# Patient Record
Sex: MALE | Race: WHITE | URBAN - NONMETROPOLITAN AREA
[De-identification: names, ages, dates, MRNs, and addresses within clinical notes are randomized per-mention and may not be internally consistent; named-entity substitution may affect disease eponyms.]

---

## 2017-06-05 ENCOUNTER — OFFICE VISIT (OUTPATIENT)
Dept: FAMILY MEDICINE CLINIC | Age: 32
End: 2017-06-05

## 2017-06-05 VITALS
RESPIRATION RATE: 20 BRPM | WEIGHT: 214.6 LBS | HEART RATE: 84 BPM | SYSTOLIC BLOOD PRESSURE: 132 MMHG | DIASTOLIC BLOOD PRESSURE: 80 MMHG | BODY MASS INDEX: 30.04 KG/M2 | HEIGHT: 71 IN

## 2017-06-05 DIAGNOSIS — F41.9 ANXIETY: Primary | ICD-10-CM

## 2017-06-05 DIAGNOSIS — R19.05 PERIUMBILICAL MASS: ICD-10-CM

## 2017-06-05 DIAGNOSIS — L85.9 HYPERKERATOSIS OF SKIN: ICD-10-CM

## 2017-06-05 DIAGNOSIS — H91.93 HEARING LOSS, BILATERAL: ICD-10-CM

## 2017-06-05 DIAGNOSIS — L40.9 SCALP PSORIASIS: ICD-10-CM

## 2017-06-05 DIAGNOSIS — Z51.81 MEDICATION MONITORING ENCOUNTER: ICD-10-CM

## 2017-06-05 DIAGNOSIS — Z00.00 ROUTINE GENERAL MEDICAL EXAMINATION AT A HEALTH CARE FACILITY: ICD-10-CM

## 2017-06-05 PROCEDURE — 99205 OFFICE O/P NEW HI 60 MIN: CPT | Performed by: FAMILY MEDICINE

## 2017-06-05 RX ORDER — WATER / MINERAL OIL / WHITE PETROLATUM 16 OZ
CREAM TOPICAL
Qty: 1 PACKAGE | COMMUNITY
Start: 2017-06-05

## 2017-06-05 RX ORDER — SULFAMETHOXAZOLE AND TRIMETHOPRIM 800; 160 MG/1; MG/1
1 TABLET ORAL 2 TIMES DAILY
Qty: 20 TABLET | Refills: 0 | Status: SHIPPED | OUTPATIENT
Start: 2017-06-05 | End: 2017-06-15

## 2017-06-05 ASSESSMENT — PATIENT HEALTH QUESTIONNAIRE - PHQ9
1. LITTLE INTEREST OR PLEASURE IN DOING THINGS: 1
SUM OF ALL RESPONSES TO PHQ QUESTIONS 1-9: 1
SUM OF ALL RESPONSES TO PHQ9 QUESTIONS 1 & 2: 1
2. FEELING DOWN, DEPRESSED OR HOPELESS: 0

## 2017-06-09 ENCOUNTER — TELEPHONE (OUTPATIENT)
Dept: FAMILY MEDICINE CLINIC | Age: 32
End: 2017-06-09

## 2017-06-29 ENCOUNTER — PROCEDURE VISIT (OUTPATIENT)
Dept: FAMILY MEDICINE CLINIC | Age: 32
End: 2017-06-29

## 2017-06-29 VITALS
BODY MASS INDEX: 30.71 KG/M2 | SYSTOLIC BLOOD PRESSURE: 145 MMHG | DIASTOLIC BLOOD PRESSURE: 85 MMHG | RESPIRATION RATE: 15 BRPM | WEIGHT: 214.51 LBS | HEART RATE: 96 BPM | HEIGHT: 70 IN

## 2017-06-29 DIAGNOSIS — E78.00 PURE HYPERCHOLESTEROLEMIA: ICD-10-CM

## 2017-06-29 DIAGNOSIS — L30.9 DERMATITIS: Primary | ICD-10-CM

## 2017-06-29 PROCEDURE — 11100 PR BIOPSY OF SKIN LESION: CPT | Performed by: FAMILY MEDICINE

## 2017-07-05 ENCOUNTER — TELEPHONE (OUTPATIENT)
Dept: AUDIOLOGY | Age: 32
End: 2017-07-05

## 2017-07-10 ENCOUNTER — TELEPHONE (OUTPATIENT)
Dept: FAMILY MEDICINE CLINIC | Age: 32
End: 2017-07-10

## 2017-07-10 ENCOUNTER — NURSE ONLY (OUTPATIENT)
Dept: FAMILY MEDICINE CLINIC | Age: 32
End: 2017-07-10

## 2017-07-10 DIAGNOSIS — L65.9 ALOPECIA: Primary | ICD-10-CM

## 2017-07-10 PROCEDURE — 99024 POSTOP FOLLOW-UP VISIT: CPT | Performed by: FAMILY MEDICINE

## 2023-08-10 ENCOUNTER — HOSPITAL ENCOUNTER (INPATIENT)
Age: 38
LOS: 4 days | Discharge: HOME | DRG: 885 | End: 2023-08-14
Attending: PSYCHIATRY & NEUROLOGY | Admitting: PSYCHIATRY & NEUROLOGY

## 2023-08-10 DIAGNOSIS — F10.929 ACUTE ALCOHOLIC INTOXICATION WITH COMPLICATION (HCC): ICD-10-CM

## 2023-08-10 DIAGNOSIS — R45.851 DEPRESSION WITH SUICIDAL IDEATION: Primary | ICD-10-CM

## 2023-08-10 DIAGNOSIS — F32.A DEPRESSION WITH SUICIDAL IDEATION: Primary | ICD-10-CM

## 2023-08-10 PROBLEM — F33.0 MDD (MAJOR DEPRESSIVE DISORDER), RECURRENT EPISODE, MILD (HCC): Status: ACTIVE | Noted: 2023-08-10

## 2023-08-10 LAB
ALBUMIN SERPL BCG-MCNC: 4.8 G/DL (ref 3.5–5.1)
ALP SERPL-CCNC: 77 U/L (ref 38–126)
ALT SERPL W/O P-5'-P-CCNC: 38 U/L (ref 11–66)
AMPHETAMINES UR QL SCN: NEGATIVE
ANION GAP SERPL CALC-SCNC: 15 MEQ/L (ref 8–16)
AST SERPL-CCNC: 29 U/L (ref 5–40)
BARBITURATES UR QL SCN: NEGATIVE
BASOPHILS ABSOLUTE: 0.1 THOU/MM3 (ref 0–0.1)
BASOPHILS NFR BLD AUTO: 1.3 %
BENZODIAZ UR QL SCN: NEGATIVE
BILIRUB CONJ SERPL-MCNC: < 0.2 MG/DL (ref 0–0.3)
BILIRUB SERPL-MCNC: 0.3 MG/DL (ref 0.3–1.2)
BUN SERPL-MCNC: 10 MG/DL (ref 7–22)
BZE UR QL SCN: NEGATIVE
CALCIUM SERPL-MCNC: 9.5 MG/DL (ref 8.5–10.5)
CANNABINOIDS UR QL SCN: NEGATIVE
CHLORIDE SERPL-SCNC: 104 MEQ/L (ref 98–111)
CO2 SERPL-SCNC: 21 MEQ/L (ref 23–33)
CREAT SERPL-MCNC: 0.8 MG/DL (ref 0.4–1.2)
DEPRECATED RDW RBC AUTO: 41.7 FL (ref 35–45)
EOSINOPHIL NFR BLD AUTO: 0.8 %
EOSINOPHILS ABSOLUTE: 0 THOU/MM3 (ref 0–0.4)
ERYTHROCYTE [DISTWIDTH] IN BLOOD BY AUTOMATED COUNT: 12.8 % (ref 11.5–14.5)
ETHANOL SERPL-MCNC: 0.09 %
ETHANOL SERPL-MCNC: 0.21 %
FENTANYL: NEGATIVE
GFR SERPL CREATININE-BSD FRML MDRD: > 60 ML/MIN/1.73M2
GLUCOSE SERPL-MCNC: 134 MG/DL (ref 70–108)
HCT VFR BLD AUTO: 49.8 % (ref 42–52)
HGB BLD-MCNC: 17 GM/DL (ref 14–18)
IMM GRANULOCYTES # BLD AUTO: 0.01 THOU/MM3 (ref 0–0.07)
IMM GRANULOCYTES NFR BLD AUTO: 0.2 %
LYMPHOCYTES ABSOLUTE: 1.8 THOU/MM3 (ref 1–4.8)
LYMPHOCYTES NFR BLD AUTO: 34.8 %
MCH RBC QN AUTO: 30.4 PG (ref 26–33)
MCHC RBC AUTO-ENTMCNC: 34.1 GM/DL (ref 32.2–35.5)
MCV RBC AUTO: 89.1 FL (ref 80–94)
MONOCYTES ABSOLUTE: 0.2 THOU/MM3 (ref 0.4–1.3)
MONOCYTES NFR BLD AUTO: 4.2 %
NEUTROPHILS NFR BLD AUTO: 58.7 %
NRBC BLD AUTO-RTO: 0 /100 WBC
OPIATES UR QL SCN: NEGATIVE
OSMOLALITY SERPL CALC.SUM OF ELEC: 280.4 MOSMOL/KG (ref 275–300)
OXYCODONE, OPI5M: NEGATIVE
PCP UR QL SCN: NEGATIVE
PLATELET # BLD AUTO: 357 THOU/MM3 (ref 130–400)
PMV BLD AUTO: 10.3 FL (ref 9.4–12.4)
POTASSIUM SERPL-SCNC: 4.2 MEQ/L (ref 3.5–5.2)
PROT SERPL-MCNC: 7.7 G/DL (ref 6.1–8)
RBC # BLD AUTO: 5.59 MILL/MM3 (ref 4.7–6.1)
SEGMENTED NEUTROPHILS ABSOLUTE COUNT: 3.1 THOU/MM3 (ref 1.8–7.7)
SODIUM SERPL-SCNC: 140 MEQ/L (ref 135–145)
WBC # BLD AUTO: 5.2 THOU/MM3 (ref 4.8–10.8)

## 2023-08-10 PROCEDURE — 82077 ASSAY SPEC XCP UR&BREATH IA: CPT

## 2023-08-10 PROCEDURE — 6370000000 HC RX 637 (ALT 250 FOR IP): Performed by: PHYSICIAN ASSISTANT

## 2023-08-10 PROCEDURE — 80307 DRUG TEST PRSMV CHEM ANLYZR: CPT

## 2023-08-10 PROCEDURE — 80053 COMPREHEN METABOLIC PANEL: CPT

## 2023-08-10 PROCEDURE — 36415 COLL VENOUS BLD VENIPUNCTURE: CPT

## 2023-08-10 PROCEDURE — 99285 EMERGENCY DEPT VISIT HI MDM: CPT

## 2023-08-10 PROCEDURE — 1240000000 HC EMOTIONAL WELLNESS R&B

## 2023-08-10 PROCEDURE — 6370000000 HC RX 637 (ALT 250 FOR IP): Performed by: PSYCHIATRY & NEUROLOGY

## 2023-08-10 PROCEDURE — 85025 COMPLETE CBC W/AUTO DIFF WBC: CPT

## 2023-08-10 PROCEDURE — 82248 BILIRUBIN DIRECT: CPT

## 2023-08-10 RX ORDER — MAGNESIUM HYDROXIDE/ALUMINUM HYDROXICE/SIMETHICONE 120; 1200; 1200 MG/30ML; MG/30ML; MG/30ML
30 SUSPENSION ORAL EVERY 6 HOURS PRN
Status: DISCONTINUED | OUTPATIENT
Start: 2023-08-10 | End: 2023-08-14 | Stop reason: HOSPADM

## 2023-08-10 RX ORDER — NICOTINE 21 MG/24HR
1 PATCH, TRANSDERMAL 24 HOURS TRANSDERMAL DAILY
Status: DISCONTINUED | OUTPATIENT
Start: 2023-08-10 | End: 2023-08-13

## 2023-08-10 RX ORDER — ACETAMINOPHEN 325 MG/1
650 TABLET ORAL EVERY 4 HOURS PRN
Status: DISCONTINUED | OUTPATIENT
Start: 2023-08-10 | End: 2023-08-14 | Stop reason: HOSPADM

## 2023-08-10 RX ORDER — IBUPROFEN 400 MG/1
400 TABLET ORAL EVERY 6 HOURS PRN
Status: DISCONTINUED | OUTPATIENT
Start: 2023-08-10 | End: 2023-08-14 | Stop reason: HOSPADM

## 2023-08-10 RX ORDER — TRAZODONE HYDROCHLORIDE 50 MG/1
50 TABLET ORAL NIGHTLY PRN
Status: DISCONTINUED | OUTPATIENT
Start: 2023-08-10 | End: 2023-08-14 | Stop reason: HOSPADM

## 2023-08-10 RX ORDER — HYDROXYZINE HYDROCHLORIDE 25 MG/1
50 TABLET, FILM COATED ORAL 3 TIMES DAILY PRN
Status: DISCONTINUED | OUTPATIENT
Start: 2023-08-10 | End: 2023-08-14 | Stop reason: HOSPADM

## 2023-08-10 RX ORDER — LORAZEPAM 1 MG/1
1 TABLET ORAL ONCE
Status: COMPLETED | OUTPATIENT
Start: 2023-08-10 | End: 2023-08-10

## 2023-08-10 RX ADMIN — LORAZEPAM 1 MG: 1 TABLET ORAL at 15:21

## 2023-08-10 RX ADMIN — HYDROXYZINE HYDROCHLORIDE 50 MG: 25 TABLET, FILM COATED ORAL at 20:52

## 2023-08-10 RX ADMIN — TRAZODONE HYDROCHLORIDE 50 MG: 50 TABLET ORAL at 20:52

## 2023-08-10 ASSESSMENT — SLEEP AND FATIGUE QUESTIONNAIRES
DO YOU HAVE DIFFICULTY SLEEPING: YES
SLEEP PATTERN: DISTURBED/INTERRUPTED SLEEP
DO YOU HAVE DIFFICULTY SLEEPING: YES
SLEEP PATTERN: DIFFICULTY FALLING ASLEEP;DISTURBED/INTERRUPTED SLEEP
DO YOU USE A SLEEP AID: NO
DO YOU USE A SLEEP AID: COMMENT
AVERAGE NUMBER OF SLEEP HOURS: 4

## 2023-08-10 ASSESSMENT — PATIENT HEALTH QUESTIONNAIRE - PHQ9
SUM OF ALL RESPONSES TO PHQ QUESTIONS 1-9: 27
SUM OF ALL RESPONSES TO PHQ QUESTIONS 1-9: 27

## 2023-08-10 ASSESSMENT — LIFESTYLE VARIABLES
HOW MANY STANDARD DRINKS CONTAINING ALCOHOL DO YOU HAVE ON A TYPICAL DAY: 3 OR 4
HOW OFTEN DO YOU HAVE A DRINK CONTAINING ALCOHOL: 2-4 TIMES A MONTH
HOW MANY STANDARD DRINKS CONTAINING ALCOHOL DO YOU HAVE ON A TYPICAL DAY: 10 OR MORE
HOW OFTEN DO YOU HAVE A DRINK CONTAINING ALCOHOL: 2-3 TIMES A WEEK

## 2023-08-10 ASSESSMENT — PAIN SCALES - GENERAL
PAINLEVEL_OUTOF10: 0
PAINLEVEL_OUTOF10: 0

## 2023-08-10 NOTE — ED NOTES
Patient becoming anxious and agitated, pacing around the room stating \" I want to leave\" and also stating \"just shoot me\" at one point patient picked up chair but then sat it back down. Chair removed from room provider notified.  Patient medicated per order will continue to monitor      Antonio Qiu RN  08/10/23 7615

## 2023-08-10 NOTE — ED NOTES
Patient sitting in bed. Cassandra Patrick in room for evaluation.  Victor M SEO at bedside      [place], [place]  08/10/23 3589

## 2023-08-10 NOTE — ED NOTES
Patient resting in bed with eyes closed and easy unlabored respirations.  No concerns voiced at this time      Melecio Hart RN  08/10/23 0528

## 2023-08-10 NOTE — ED NOTES
Shannan Gabriel dad [phone]  Desirae Feng mom [phone]    Pt escalating-states he is going to leave-[employer] had dad step out of the [address] [place] [address] Drive in to speak with patient-Patient states he does not want his parents in the room. Spoke with parents at this time.  They are going to go home at this time, but please call if needed     Manny Coon RN  08/10/23 0610

## 2023-08-10 NOTE — ED NOTES
Patient escorted to ER by police for suicidal ideation. Patient reports drinking heavily and got into an argument with family member. Police was called due to patient wanting to be shot. When police arrived patient wanted shot by them. On arrival patient is calm and cooperative. Patient keeps making the same statement \"just shoot me\".      Aniya Urbano RN  08/10/23 9802

## 2023-08-10 NOTE — PROGRESS NOTES
BAL 24 Hour Re-Assess:   Status & Exam & Behavior Support Service Tabs      Present Suicidal Behavior:      Verbal: Denies    Plan: Denies    Current Suicide Risk: Low, Moderate or High: High      Present Homicidal Behavior:    Verbal: Denies    Plan: Denies      Psychosis:    Hallucinations: Denies    Delusions: None noted      Clinical Re-Assessment Summary including Current Mental State of Patient:     Initial assessment:    Patient is a 40year old male who presents to the ED under [facility] via [facility]. Per [facility], \"Law enforcement was called by Bruce Singh parents to come to their home where Margo was. Dispatch advised parents stated he was suicidal and becoming combative. Was also advised Ravin Long had been drinking. When I arrived Margo was standing in the driveway. He had a defensive position, leaning slightly forward, feet part, clinching fists. He stated numerous times he wanted to die by suicide by . He yelled numerous times we needed to pull our guns. Just shoot him. He stated the world would be better off without him. He would talk to me stating I was Lon Garcia. He eventually came up where I was. Turned around and put his hands behind his back. I advised him he was not under arrest but was going to be cuffed. He indicated he was ok with that. On the way to [address] [place] [address] he stated numerous times the world would be better off without him. He stated he did not want to hurt anyone and would just wait a couple weeks then kill himself. He stated he has nothing to live for (no wife, kids) \". Upon arrival, pt appears intoxicated. Pt's BAL was . 24 Spoke with pt who reports he is suicidal \"everyday. .continuous. .concurrent. . whatever you want to put on that piece of paper\". Pt states his plan would be to \"take car left on railroad tracks, carbon monoxide poisoning or blow his brains out\" if he had access to gun. Denied previous suicidal attempts.  Pt is actively suicidal stating \"just shoot me in the

## 2023-08-10 NOTE — ED NOTES
Pateint resting in bed no concerns voiced at this time will continue to monitor      Kristel Harman RN  08/10/23 6668

## 2023-08-10 NOTE — ED NOTES
Patient placed in safe room that is ligature resistant with continuous monitoring in place. Provider notified, requested an assessment by behavioral health . Patient belongings secured in a locked lockers outside of the room. Explained suicide prevention precautions to the patient including constant observer.        Melecio Hart RN  08/10/23 0900

## 2023-08-10 NOTE — PROGRESS NOTES
Chief Complaint: Suicidal    Provisional Diagnosis:     Unspecified Depressive Disorder      Risk, Psychosocial and Contextual Factors:  Previous legal, substance use. Current  Treatment:         Denied        Present Suicidal Behavior:       Verbal: Yes                                                    Attempt: None reported     Access to Weapons:  No, pt states he is a felon     Current Suicide Risk: Low, Moderate or High:  High     Past Suicidal Behavior:                 Verbal: Yes    Attempt: Denied     Self-Injurious/Self-Mutilation: Denied     Traumatic Event Within Past 2 Weeks:   Denied     Current Abuse: None reported     Legal: Yes, history. Per record search, pt was previously charged with 'aggravated burglary and felonious assault'. Violence: Yes, history     Protective Factors:  Support from family     Housing: Resides with his parents     CPAP/Oxygen/Ambulation Difficulties: see H&P     Basic Vital Signs Normal?: Check with Patients Nurse prior to Calling Psychiatry     Critical Labs?: Check with Patients Nurse prior to Calling Psychiatry     Clinical Summary:       Patient is a 40year old male who presents to the ED under [facility] via [facility]. Per [facility], \"Law enforcement was called by Christen rangel to come to their home where Margo was. Dispatch advised parents stated he was suicidal and becoming combative. Was also advised Ciaran Dailey had been drinking. When I arrived Margo was standing in the driveway. He had a defensive position, leaning slightly forward, feet part, clinching fists. He stated numerous times he wanted to die by suicide by . He yelled numerous times we needed to pull our guns. Just shoot him. He stated the world would be better off without him. He would talk to me stating I was Antonio Amaya. He eventually came up where I was. Turned around and put his hands behind his back. I advised him he was not under arrest but was going to be cuffed.  He indicated he was

## 2023-08-11 PROBLEM — F33.2 MDD (MAJOR DEPRESSIVE DISORDER), RECURRENT SEVERE, WITHOUT PSYCHOSIS (HCC): Status: ACTIVE | Noted: 2023-08-10

## 2023-08-11 PROCEDURE — 6370000000 HC RX 637 (ALT 250 FOR IP): Performed by: PHYSICIAN ASSISTANT

## 2023-08-11 PROCEDURE — APPSS30 APP SPLIT SHARED TIME 16-30 MINUTES: Performed by: PHYSICIAN ASSISTANT

## 2023-08-11 PROCEDURE — 1240000000 HC EMOTIONAL WELLNESS R&B

## 2023-08-11 PROCEDURE — 6370000000 HC RX 637 (ALT 250 FOR IP): Performed by: PSYCHIATRY & NEUROLOGY

## 2023-08-11 RX ORDER — MIRTAZAPINE 7.5 MG/1
7.5 TABLET, FILM COATED ORAL NIGHTLY
Status: DISCONTINUED | OUTPATIENT
Start: 2023-08-11 | End: 2023-08-14 | Stop reason: HOSPADM

## 2023-08-11 RX ORDER — LORAZEPAM 2 MG/1
2 TABLET ORAL EVERY 6 HOURS PRN
Status: DISCONTINUED | OUTPATIENT
Start: 2023-08-11 | End: 2023-08-14 | Stop reason: HOSPADM

## 2023-08-11 RX ORDER — HALOPERIDOL 5 MG/ML
5 INJECTION INTRAMUSCULAR EVERY 6 HOURS PRN
Status: DISCONTINUED | OUTPATIENT
Start: 2023-08-11 | End: 2023-08-14 | Stop reason: HOSPADM

## 2023-08-11 RX ORDER — HALOPERIDOL 5 MG/1
5 TABLET ORAL EVERY 6 HOURS PRN
Status: DISCONTINUED | OUTPATIENT
Start: 2023-08-11 | End: 2023-08-14 | Stop reason: HOSPADM

## 2023-08-11 RX ORDER — LORAZEPAM 2 MG/ML
2 INJECTION INTRAMUSCULAR EVERY 6 HOURS PRN
Status: DISCONTINUED | OUTPATIENT
Start: 2023-08-11 | End: 2023-08-14 | Stop reason: HOSPADM

## 2023-08-11 RX ADMIN — LORAZEPAM 2 MG: 2 TABLET ORAL at 10:36

## 2023-08-11 RX ADMIN — MIRTAZAPINE 7.5 MG: 7.5 TABLET ORAL at 20:46

## 2023-08-11 RX ADMIN — HALOPERIDOL 5 MG: 5 TABLET ORAL at 10:36

## 2023-08-11 RX ADMIN — HYDROXYZINE HYDROCHLORIDE 50 MG: 25 TABLET, FILM COATED ORAL at 20:46

## 2023-08-11 RX ADMIN — TRAZODONE HYDROCHLORIDE 50 MG: 50 TABLET ORAL at 20:46

## 2023-08-11 ASSESSMENT — PAIN SCALES - GENERAL: PAINLEVEL_OUTOF10: 0

## 2023-08-11 NOTE — PROGRESS NOTES
Behavioral Health   Admission Note   Admission Type: Involuntary    Reason for Admission: suicidal thoughts    Patient Strengths/Barriers  Strengths (Must Choose Two): Stable housing, Support from family  Barriers: Motivation level for treatment, Support of organized community    Addictive Behavior  In the Past 3 Months, Have You Felt or Has Someone Told You That You Have a Problem With  : None    Medical Problems:   Past Medical History:   Diagnosis Date    Anxiety     Psychiatric problem        Status EXAM:  Mental Status and Behavioral Exam  Normal: No  Level of Assistance: Independent/Self  Facial Expression: Worried, Sad  Affect: Blunt  Level of Consciousness: Alert  Frequency of Checks: 4 times per hour, close  Mood:Normal: No  Mood: Depressed, Anxious  Motor Activity:Normal: Yes  Eye Contact: Fair  Observed Behavior: Guarded  Sexual Misconduct History: Past - no (pt denies)  Preception: [place] to person, [place] to time, [place] to place, [place] to situation  Attention:Normal: Yes  Thought Processes: Circumstantial  Thought Content:Normal: Yes  Depression Symptoms: Change in energy level, Sleep disturbance, Loss of interest, Impaired concentration  Anxiety Symptoms: Generalized  Malinda Symptoms: No problems reported or observed. Hallucinations: None  Delusions: No  Memory:Normal: Yes  Insight and Judgment: No  Insight and Judgment: Poor insight    Pt admitted with followings belongings:  Dental Appliances: None  Vision - Corrective Lenses: None  Hearing Aid: None  Jewelry: None  Body Piercings Removed: N/A  Clothing: Belt, Pants, Shirt (pair of boots, vape, assorted cards in the wallet)  Other Valuables: [facility]     Admission order obtained Yes  Valuables placed in lock up. Patient's home medications were none. Patient oriented to surroundings and program expectations and copy of patient rights given. Received admission packet:  Yes  Consents reviewed, signed Yes.  Outcomes Questionnaire completed

## 2023-08-11 NOTE — PROGRESS NOTES
Patient approaches unit demanding to leave. Writer attempts to educate patient about [facility]. Patient refuses education. Patient begins accusing writer of holding him against his will. Writer again attempts to educate patient about [facility]. Patient states there is no reason to keep him here under [facility]. Writer reminds patients of statements he's made regarding suicidal ideation. Patient states \"It's been that way my whole life! \" Patient states \"If you don't let me out of here, I'm gonna lose my job. And if I lose my job, that doesn't end well. \" [place] police called by charge nurse for show of support. Patient offered chance to use phone to call work. Patient states he does not have the number for work. Writer offers to look up phone number. Patient refuses stating it cannot be found on Internet. Patient offered prn agitation medication. Patient refuses. Patient states \"I'll just sit in my room locked up for the next 3 fucking days. \"

## 2023-08-11 NOTE — H&P
start Remeron and titrate to effect  Consults: none    Risk level: High     Behavioral Services  Medicare Certification     Admission Day 1  I certify that this patient's inpatient psychiatric hospital admission is medically necessary for:    x (1) treatment which could reasonably be expected to improve this patient's condition, or    x (2) diagnostic study or its equivalent. Rao Tang is a 40 y.o. male being evaluated by a Virtual Visit (video visit) encounter to address concerns as mentioned above. A caregiver was present in the room along with the patient. Services were provided through a video synchronous discussion virtually to substitute for in-person visit by provider. Patient's consent was obtained for Tele visit. Patient is present at [address] [place] [address] and I am physically present at [place], [place]. This Virtual Visit was conducted with patient's consent and standard of care was followed with help of in person evaluation by ADRIANO. The patient is located in a state ([place]) where I am licensed to provide care. --Wilmar Acosta MD on 8/11/2023 at 1:48 PM    An electronic signature was used to authenticate this note. **This report has been created using voice recognition software. It may contain minor errors which are inherent in voice recognition technology. **

## 2023-08-11 NOTE — PROGRESS NOTES
OQ Admission Report    Most Recent Score:    111    Baseline Score:   111    Change From Initial: No Reliable Change  Distress Level: Severe       Graph Type: Total  Most Recent Critical Item Status:  7. Suicide - I have thoughts of ending my life. Almost Always  11. Substance Abuse - I use alcohol or a drug to get going in the morning. Never  20. Substance Abuse - People criticize my drinking (or drug use). Almost Always  24. Substance Abuse - I have trouble at work/school or other daily activities because of drinking or drug use.  Almost Always

## 2023-08-11 NOTE — PROGRESS NOTES
Behavioral Services  Medicare Certification Upon Admission    I certify that this patient's inpatient psychiatric hospital admission is medically necessary for:    [x] (1) Treatment which could reasonably be expected to improve this patient's condition,       [x] (2) Or for diagnostic study;     AND     [x](2) The inpatient psychiatric services are provided while the individual is under the care of a physician and are included in the individualized plan of care.     Estimated length of stay/service 3-5 days    Plan for post-hospital care hc    Electronically signed by Sahara Milan MD on 8/11/2023 at 8:00 AM

## 2023-08-12 PROBLEM — F41.9 ANXIETY DISORDER: Status: ACTIVE | Noted: 2023-08-12

## 2023-08-12 PROBLEM — F33.2 SEVERE EPISODE OF RECURRENT MAJOR DEPRESSIVE DISORDER, WITHOUT PSYCHOTIC FEATURES (HCC): Status: ACTIVE | Noted: 2023-08-12

## 2023-08-12 PROCEDURE — 6370000000 HC RX 637 (ALT 250 FOR IP): Performed by: PHYSICIAN ASSISTANT

## 2023-08-12 PROCEDURE — 1240000000 HC EMOTIONAL WELLNESS R&B

## 2023-08-12 PROCEDURE — 6370000000 HC RX 637 (ALT 250 FOR IP): Performed by: PSYCHIATRY & NEUROLOGY

## 2023-08-12 PROCEDURE — APPSS30 APP SPLIT SHARED TIME 16-30 MINUTES: Performed by: NURSE PRACTITIONER

## 2023-08-12 RX ADMIN — HALOPERIDOL 5 MG: 5 TABLET ORAL at 10:51

## 2023-08-12 RX ADMIN — LORAZEPAM 2 MG: 2 TABLET ORAL at 10:52

## 2023-08-12 RX ADMIN — HYDROXYZINE HYDROCHLORIDE 50 MG: 25 TABLET, FILM COATED ORAL at 20:24

## 2023-08-12 RX ADMIN — TRAZODONE HYDROCHLORIDE 50 MG: 50 TABLET ORAL at 20:24

## 2023-08-12 RX ADMIN — MIRTAZAPINE 7.5 MG: 7.5 TABLET ORAL at 20:24

## 2023-08-12 ASSESSMENT — PAIN SCALES - GENERAL: PAINLEVEL_OUTOF10: 0

## 2023-08-12 NOTE — PATIENT CARE CONFERENCE
Psychosocial Assessment    Current Level of Psychosocial Functioning     Independent   Dependent    Minimal Assist     Comments:  IRASEAM    Psychosocial High Risk Factors (check all that apply)    Unable to obtain meds   Chronic illness/pain    Substance abuse   Lack of Family Support   Financial stress   Isolation   Inadequate Community Resources  Suicide attempt(s)  Not taking medications   Victim of crime   Developmental Delay  Unable to manage personal needs    Age 72 or older   Homeless  No transportation   Readmission within 30 days  Unemployment  Traumatic Event  IRASEMA    Family/Supports identified: IRASEMA    Sexual Orientation:  IRASEMA    Patient Strengths: IRASEMA    Patient Barriers: IRASEMA    Safety plan: IRASEMA    CMHC/MH history: IRASEMA    Plan of Care:  medication management, group/individual therapies, family meetings, psycho -education, treatment team meetings to assist with stabilization    Initial Discharge Plan:  IRASEMA    Clinical Summary:  IRASEMA
[facility]  Initial Interdisciplinary Treatment Plan NOTE    REVIEW DATE AND TIME: 8/11/23 1532    PATIENT was not IN TREATMENT TEAM.  See Multidisciplinary Treatment Team sheet for participants. ADMISSION TYPE:   Admission Type: Involuntary    REASON FOR ADMISSION:  Reason for Admission: suicidal thoughts      Estimated Length of Stay Update:  3-5 days  Estimated Discharge Date Update: 8/12/23    Patient Strengths/Barriers  Strengths (Must Choose Two): Stable housing, Support from family  Barriers: Motivation level for treatment, Support of organized community  Addictive Behavior:Addictive Behavior  In the Past 3 Months, Have You Felt or Has Someone Told You That You Have a Problem With  : None  Medical Problems:  Past Medical History:   Diagnosis Date    Anxiety     Psychiatric problem        EDUCATION:   Learner Progress Toward Treatment Goals: Reviewed results and recommendations of this team, Reviewed group plan and strategies, Reviewed signs, symptoms and risk of self harm and violent behavior, and Reviewed goals and plan of care    Method: Individual    Outcome: No evidence of Learning    PATIENT GOALS: IRASEMA    OQ TOP QUALITY PRIORITIES FOR THE PATIENT AS IDENTIFIED ON ADMISSION ADMINISTRATION:        111    PLAN/TREATMENT RECOMMENDATIONS UPDATE:   What is the most important thing we can help you with while you are here? IRASEMA  Who is your support system? IRASEMA  Do you have follow-up providers? IRASEMA  Do you have the ability to pay for your medications? Yes  Where will you be residing when you leave the hospital? [facility]  Will need a return to work slip or FMLA paper completion?  Yes      GOALS UPDATE:   Time frame for Short-Term Goals: Daily    OLAYINKA Diez
Independent/Self    Patient Monitoring:  Frequency of Checks: 4 times per hour, close    Psychiatric Symptoms:   Depression Symptoms  Depression Symptoms: Isolative, Change in energy level, Loss of interest, Impaired concentration  Anxiety Symptoms  Anxiety Symptoms: Generalized  Malinda Symptoms  Malinda Symptoms: No problems reported or observed. Suicide Risk CSSR-S:  1) Within the past month, have you wished you were dead or wished you could go to sleep and not wake up? : Yes  2) Have you actually had any thoughts of killing yourself? : Yes  3) Have you been thinking about how you might kill yourself? : Yes  5) Have you started to work out or worked out the details of how to kill yourself? Do you intend to carry out this plan? : No  6) Have you ever done anything, started to do anything, or prepared to do anything to end your life?: No    EDUCATION:   Learner Progress Toward Treatment Goals: Reviewed results and recommendations of this team, Reviewed group plan and strategies, Reviewed signs, symptoms and risk of self harm and violent behavior, and Reviewed goals and plan of care    Method: Individual    Outcome: No evidence of Learning and Refused Education    PATIENT GOALS: IRASEMA    OQ TOP QUALITY PRIORITIES FOR THE PATIENT AS IDENTIFIED ON ADMISSION ADMINISTRATION:        111- Suicidal Ideation/Substance Abuse      PLAN/TREATMENT RECOMMENDATIONS UPDATE:  How are you progressing toward meeting your main treatment goal? IRASEMA  2. Are there discharge barriers/lingering problems that need to be addressed? Patient provides minimal information. Pt states \"I'm going to be sitting here in my bed today, tomorrow, and the next day\". Patient states \"this made me lose my job so this has only made things worse\". Pt very irritable. 3.  Do you have the ability to pay for your medications? No      4. How is your group participation? Not attending. Patient is isolated in room.      GOALS UPDATE:   Time frame for

## 2023-08-12 NOTE — PROGRESS NOTES
Pt did not attend goal wrap up and relaxation group, pt isolated to room and napped all evening, 1:1 offered, pt denies setting a goal today.

## 2023-08-12 NOTE — PROGRESS NOTES
Discharge planning 1014- Discussed with patient about follow up services. This writer informed by PA that patient was not interested in [facility]. Discussed with patient that [facility] is the only local option. Patient was provided with other resources around the area such as [facility] and [facility] in [place]. Patient states he does not have insurance and has lost his job due to [employer] in here\". Patient irritable. Pt states he will attempt [facility] again.

## 2023-08-12 NOTE — PROGRESS NOTES
Discharge planning [address] is to go to [facility] during their open access times   Monday through Thursday 7:00 am - 7:00 pm and Friday 8:00 am - 12:00 pm to establish services. -- Follow up with your primary doctor within 2-3 days. Bring a copy of your results from today and discuss them with your doctor.   -- Return to ER immediately for new or worsening symptoms, any urgent issues, or for any concerns. no

## 2023-08-13 PROCEDURE — 6370000000 HC RX 637 (ALT 250 FOR IP): Performed by: PHYSICIAN ASSISTANT

## 2023-08-13 PROCEDURE — 1240000000 HC EMOTIONAL WELLNESS R&B

## 2023-08-13 PROCEDURE — 6370000000 HC RX 637 (ALT 250 FOR IP): Performed by: PSYCHIATRY & NEUROLOGY

## 2023-08-13 PROCEDURE — APPSS30 APP SPLIT SHARED TIME 16-30 MINUTES: Performed by: NURSE PRACTITIONER

## 2023-08-13 RX ADMIN — HYDROXYZINE HYDROCHLORIDE 50 MG: 25 TABLET, FILM COATED ORAL at 11:33

## 2023-08-13 RX ADMIN — MIRTAZAPINE 7.5 MG: 7.5 TABLET ORAL at 20:16

## 2023-08-13 ASSESSMENT — PAIN SCALES - GENERAL: PAINLEVEL_OUTOF10: 0

## 2023-08-13 NOTE — GROUP NOTE
Group Therapy Note    Date: 8/13/2023    Group Start Time: 1585  Group End Time: 0859  Group Topic: Healthy Living/Wellness    [facility]    David Bolanos LPN        Group Therapy Note    Attendees: 6       Notes:  did not attend    Discipline Responsible: Licensed Practical Nurse      Signature:  David Bolanos LPN

## 2023-08-13 NOTE — GROUP NOTE
Group Therapy Note    Date: 8/13/2023    Group Start Time: 9102  Group End Time: 0779  Group Topic: Healthy Living/Wellness    [facility]    Evelina Hernandez LPN        Group Therapy Note    Attendees: 6       Notes:  attended    Status After Intervention:  Improved    Participation Level:  Active Listener    Participation Quality: Appropriate and Attentive      Speech:  normal      Thought Process/Content: Logical      Affective Functioning: Flat      Level of consciousness:  Alert, Oriented x4, and Attentive      Response to Learning: Able to verbalize current knowledge/experience, Able to verbalize/acknowledge new learning, Able to retain information, and Capable of insight      Endings: None Reported    Modes of Intervention: Education      Discipline Responsible: Licensed Practical Nurse      Signature:  Evelina Hernandez LPN

## 2023-08-14 VITALS
DIASTOLIC BLOOD PRESSURE: 80 MMHG | HEIGHT: 70 IN | HEART RATE: 83 BPM | WEIGHT: 194 LBS | SYSTOLIC BLOOD PRESSURE: 130 MMHG | TEMPERATURE: 96.1 F | OXYGEN SATURATION: 97 % | BODY MASS INDEX: 27.77 KG/M2 | RESPIRATION RATE: 16 BRPM

## 2023-08-14 PROCEDURE — 5130000000 HC BRIDGE APPOINTMENT

## 2023-08-14 RX ORDER — HYDROXYZINE 50 MG/1
50 TABLET, FILM COATED ORAL 3 TIMES DAILY PRN
Qty: 30 TABLET | Refills: 0 | Status: SHIPPED | OUTPATIENT
Start: 2023-08-14 | End: 2023-08-24

## 2023-08-14 RX ORDER — TRAZODONE HYDROCHLORIDE 50 MG/1
50 TABLET ORAL NIGHTLY PRN
Qty: 30 TABLET | Refills: 0 | Status: SHIPPED | OUTPATIENT
Start: 2023-08-14

## 2023-08-14 RX ORDER — MIRTAZAPINE 7.5 MG/1
7.5 TABLET, FILM COATED ORAL NIGHTLY
Qty: 30 TABLET | Refills: 0 | Status: SHIPPED | OUTPATIENT
Start: 2023-08-14

## 2023-08-14 ASSESSMENT — PAIN SCALES - GENERAL: PAINLEVEL_OUTOF10: 0

## 2023-08-14 NOTE — PROGRESS NOTES
Behavioral Health   Discharge Note    Pt discharged with followings belongings:   Dental Appliances: None  Vision - Corrective Lenses: None  Hearing Aid: None  Jewelry: None  Body Piercings Removed: N/A  Clothing: Belt, Pants, Shirt (pair of boots, vape, assorted cards in the wallet)  Other Valuables: [facility] retrieved from safe, security envelope number:  n/a and returned to patient. Patient left department with transport staff via ambulation. Discharged to home. \"An Important Message from Medicare About Your Rights\" (IMM) form photocopy original from admission and provided to pt at least 4 hours prior to discharge N/A. If pt left within 4 hours of receiving 2nd delivery of IMM, this is because pt was agreeable with hospital discharge. Patient/guardian education on aftercare instructions: Yes  Bridge appointment completed:  yes. Reviewed Discharge Instructions with patient/family/nursing facility. Patient/family verbalizes understanding and agreement with the discharge plan using the teachback method. Information faxed to follow up provider by staff and [address] [place] [address]. Patient/family verbalize understanding of AVS:Yes    Status EXAM upon discharge:  Mental Status and Behavioral Exam  Normal: No  Level of Assistance: Independent/Self  Facial Expression: Brightened  Affect: Blunt  Level of Consciousness: Alert  Frequency of Checks: 4 times per hour, close  Mood:Normal: Yes  Mood: Other (comment) (denies all when questioned)  Motor Activity:Normal: Yes  Motor Activity: Decreased  Eye Contact: Good  Observed Behavior: Cooperative, Friendly  Sexual Misconduct History: Current - no  Preception: [place] to person, [place] to time, [place] to place, [place] to situation  Attention:Normal: Yes  Attention: Distractible  Thought Processes: Circumstantial  Thought Content:Normal: Yes  Thought Content: Poverty of content  Depression Symptoms: No problems reported or observed.   Anxiety Symptoms: No problems

## 2023-08-14 NOTE — BH NOTE
24 hour chart review completed.
INPATIENT RECREATIONAL THERAPY  ADULT BEHAVIORAL SERVICES  EVALUATION    REFERRING PHYSICIAN:  Dr. Soha De La Vega  DIAGNOSIS:   Major Depressive Disorder, Severe without Psychosis  PRECAUTIONS:  standard precautions    HISTORY OF PRESENT ILLNESS/INJURY:   Patient was admitted to the unit due to suicidal ideation and depression. Patient was brought in by police to the ED due to having thoughts of suicide by . Patient also reported having thoughts of committing suicide by carbon monoxide poisoning or parking his car on some railroad tracks and waiting for a train to hit him. Patient reported that he has stress due to having an argument with a family member. Patient was also abusing alcohol prior to admission. Patient irritable, guarded, isolating in his room and wanting to go home because he is worried about losing his job. PMH:  Please see medical chart for prior medical history, allergies, and medication    HISTORY OF PSYCHIATRIC TREATMENT:  None reported    DATE OF BIRTH:  [birth year]  GENDER:  male  MARITAL STATUS:  single    EMPLOYMENT STATUS:  Employed at [employer]. LIVING SITUATION/SUPPORT:  Patient was living with his parents. Patient voiced concern over parents letting him come back and is now anxious about being homeless. EDUCATIONAL LEVEL:   did not assess    MEDICATION/DRUG USE:  Alcohol abuse. LEISURE INTERESTS:  no reported leisure interests at this time  ACTIVITY PREFERENCE:  Individual preferred - isolating in his room. ACTIVITY TYPES: IRASEMA  COGNITION:  A&Ox4    COPING:    poor  ATTENTION:   fair  RELAXATION:  Patient reported anxiety and poor sleep. SELF-ESTEEM:   poor  MOTIVATION:    poor - poor insight    SOCIAL SKILLS:   poor - isolating in room  FRUSTRATION TOLERANCE:  Poor - Patient became agitated in the ED and wanting to leave. Patient continues to be irritable. ATTENTION SEEKING:  Patient demonstrated agitation in the ED.  Patient is isolating in his room  and remains irritable about
Patient did not attend the goal group/community meeting or the recreation group this morning.
Patient did not participate in wrap up group, isolated to room. Cooperative otherwise.
[facility] [phone]

## 2023-08-14 NOTE — PLAN OF CARE
Patient has not attended any of the groups today and has been isolating in his room so he has not been able to demonstrate effective coping strategies at this time. Patient will be encouraged to attend all groups on the unit daily and to come out of his room for social interaction with others during his hospital stay. Problem: Discharge Planning  Goal: Discharge to home or other facility with appropriate resources  8/11/2023 1028 by Massimo Arvizu RN  Outcome: Not Progressing  Flowsheets (Taken 8/11/2023 1028)  Discharge to home or other facility with appropriate resources:   Identify barriers to discharge with patient and caregiver   Arrange for needed discharge resources and transportation as appropriate   Identify discharge learning needs (meds, wound care, etc)  Note: Patient not discharged this shift. Patient continues to work with care team toward discharge goal.      Problem: Depression  Goal: Will be euthymic at discharge  8/11/2023 1028 by Massimo Arvizu RN  Outcome: Not Progressing  Note: Patient is angry, irritable dismissive. Denies all but rates his mood a #1/10. Problem: Involuntary Admit  Goal: Will cooperate with staff recommendations and doctor's orders and will demonstrate appropriate behavior  8/11/2023 1028 by Massimo Arvizu RN  Outcome: Not Progressing  Flowsheets (Taken 8/11/2023 1028)  Will cooperate with staff recommendations and doctor's orders and will demonstrate appropriate behavior:   Treat underlying conditions and offer medication as ordered   Educate regarding involuntary admission procedures and rules   Contain excessive/inappropriate behavior per unit and hospital policies  Note: Patient becomes irritable, angry and hostile toward writer. Demands to leave. Accuses writer of not helping and holding patient against his will. Difficult to re-direct.       Problem: Anxiety  Goal: Will report anxiety at manageable levels  8/11/2023 1039 by Massimo Arvizu RN  Outcome: Not
Problem: Discharge Planning  Goal: Discharge to home or other facility with appropriate resources  8/13/2023 0959 by Lary Vivar RN  Outcome: Progressing  Note: Patient plans to go home with parents and follow up with [facility]  8/12/2023 2056 by Chang Griffin RN  Outcome: Progressing  Note: Pt plans to return home with his parents and follow up at [facility], parents visited today and pt is allowed to return home     Problem: Self Harm/Suicidality  Goal: Will have no self-injury during hospital stay  Description: INTERVENTIONS:  1. Ensure constant observer at bedside with Q15M safety checks  2. Maintain a safe environment  3. Secure patient belongings  4. Ensure family/visitors adhere to safety recommendations  5. Ensure safety tray has been added to patient's diet order  6. Every shift and PRN: Re-assess suicidal risk via Frequent Screener    8/13/2023 0959 by Lary Vivar RN  Outcome: Progressing  Note: No self harm behaviors were observed or reported so far this shift. Remains on every 15 minutes precautions for safety. Patient denies suicidal ideations at present time   8/12/2023 2056 by Chang Griffin RN  Outcome: Progressing  Note: Pt denies having suicidal/self harm thoughts     Problem: Depression  Goal: Will be euthymic at discharge  Description: INTERVENTIONS:  1. Administer medication as ordered  2. Provide emotional support via 1:1 interaction with staff  3. Encourage involvement in milieu/groups/activities  4.  Monitor for social isolation  8/13/2023 0959 by Lary Vivar RN  Outcome: Progressing  8/12/2023 2056 by Chang Griffin RN  Outcome: Progressing  Note: Mood is tired this evening, pt had emergency po medications on previous shift, pt isolates to bed and has been napping this evening, pt did come out for a snack, no peer interaction noted     Problem: Drug Abuse/Detox  Goal: Will have no detox symptoms and will verbalize plan for changing drug-related
Problem: Discharge Planning  Goal: Discharge to home or other facility with appropriate resources  8/13/2023 2050 by Brayan Murray RN  Outcome: Progressing  Flowsheets (Taken 8/13/2023 2050)  Discharge to home or other facility with appropriate resources: Identify barriers to discharge with patient and caregiver  Note: Patient states he will discharge home with his family. 8/13/2023 0959 by Milind Reece RN  Outcome: Progressing  Note: Patient plans to go home with parents and follow up with [facility]     Problem: Self Harm/Suicidality  Goal: Will have no self-injury during hospital stay  Description: INTERVENTIONS:  1. Ensure constant observer at bedside with Q15M safety checks  2. Maintain a safe environment  3. Secure patient belongings  4. Ensure family/visitors adhere to safety recommendations  5. Ensure safety tray has been added to patient's diet order  6. Every shift and PRN: Re-assess suicidal risk via Frequent Screener    8/13/2023 2050 by Brayan Murray RN  Outcome: Progressing  Flowsheets (Taken 8/13/2023 2050)  Will have no self-injury during hospital stay:   Maintain a safe environment   Every shift and PRN: Re-assess suicidal risk via Frequent Screener  Note: Patient denies thoughts of self harm. No self harm injury noted at this time. 8/13/2023 0959 by Milind Reece RN  Outcome: Progressing  Note: No self harm behaviors were observed or reported so far this shift. Remains on every 15 minutes precautions for safety. Patient denies suicidal ideations at present time      Problem: Depression  Goal: Will be euthymic at discharge  Description: INTERVENTIONS:  1. Administer medication as ordered  2. Provide emotional support via 1:1 interaction with staff  3. Encourage involvement in milieu/groups/activities  4. Monitor for social isolation  8/13/2023 2050 by Brayan Murray RN  Outcome: Progressing  Note: Patient denies depression.   8/13/2023 0959 by Milind Reece RN  Outcome:
Problem: Discharge Planning  Goal: Discharge to home or other facility with appropriate resources  Outcome: Not Progressing  Flowsheets (Taken 8/11/2023 1028)  Discharge to home or other facility with appropriate resources:   Identify barriers to discharge with patient and caregiver   Arrange for needed discharge resources and transportation as appropriate   Identify discharge learning needs (meds, wound care, etc)  Note: Patient not discharged this shift. Patient continues to work with care team toward discharge goal.      Problem: Self Harm/Suicidality  Goal: Will have no self-injury during hospital stay  Description: INTERVENTIONS:  1. Ensure constant observer at bedside with Q15M safety checks  2. Maintain a safe environment  3. Secure patient belongings  4. Ensure family/visitors adhere to safety recommendations  5. Ensure safety tray has been added to patient's diet order  6. Every shift and PRN: Re-assess suicidal risk via Frequent Screener    Outcome: Progressing  Flowsheets (Taken 8/11/2023 1028)  Will have no self-injury during hospital stay:   Ensure constant observer at bedside with Q15M safety checks   Maintain a safe environment  Note: No self injury this shift. Problem: Depression  Goal: Will be euthymic at discharge  Description: INTERVENTIONS:  1. Administer medication as ordered  2. Provide emotional support via 1:1 interaction with staff  3. Encourage involvement in milieu/groups/activities  4. Monitor for social isolation  Outcome: Not Progressing  Note: Patient is angry, irritable dismissive. Denies all but rates his mood a #1/10. Problem: Drug Abuse/Detox  Goal: Will have no detox symptoms and will verbalize plan for changing drug-related behavior  Description: INTERVENTIONS:  1. Administer medication as ordered  2. Monitor physical status  3. Provide emotional support with 1:1 interaction with staff  4.  Encourage  recovery focused treatment   Outcome: Progressing  Flowsheets
Abuse/Detox  Goal: Will have no detox symptoms and will verbalize plan for changing drug-related behavior  Description: INTERVENTIONS:  1. Administer medication as ordered  2. Monitor physical status  3. Provide emotional support with 1:1 interaction with staff  4. Encourage  recovery focused treatment   8/12/2023 2056 by Lulú Valiente RN  Outcome: Progressing  Note: Pt denies having withdrawal symptoms  8/12/2023 1251 by Hina Grewal RN  Outcome: Progressing     Problem: Involuntary Admit  Goal: Will cooperate with staff recommendations and doctor's orders and will demonstrate appropriate behavior  Description: INTERVENTIONS:  1. Treat underlying conditions and offer medication as ordered  2. Educate regarding involuntary admission procedures and rules  3. Contain excessive/inappropriate behavior per unit and hospital policies  1/01/1240 [id number] by Lulú Valiente RN  Outcome: Progressing  Flowsheets (Taken 8/12/2023 2056)  Will cooperate with staff recommendations and doctor's orders and will demonstrate appropriate behavior: Treat underlying conditions and offer medication as ordered  8/12/2023 1251 by Hina Grewal RN  Outcome: Progressing     Problem: Risk for Elopement  Goal: Patient will not exit the unit/facility without proper excort  8/12/2023 2056 by Lulú Valiente RN  Outcome: Progressing  Note: Pt has been cooperative, pt has had no exit seeking behavior this evening  8/12/2023 1251 by Hina Grewal RN  Outcome: Progressing  Note: Patient denies wanting to leave unit at present. Problem: Anxiety  Goal: Will report anxiety at manageable levels  Description: INTERVENTIONS:  1. Administer medication as ordered  2. Teach and rehearse alternative coping skills  3.  Provide emotional support with 1:1 interaction with staff  8/12/2023 2056 by Lulú Valiente RN  Outcome: Progressing  Flowsheets (Taken 8/12/2023 2056)  Will report anxiety at manageable levels: Administer medication as
and was napping all evening, was cooperative with vital signs and answered some assessment questions     Problem: Drug Abuse/Detox  Goal: Will have no detox symptoms and will verbalize plan for changing drug-related behavior  Description: INTERVENTIONS:  1. Administer medication as ordered  2. Monitor physical status  3. Provide emotional support with 1:1 interaction with staff  4. Encourage  recovery focused treatment   8/12/2023 1251 by Alia Hartman RN  Outcome: Progressing  8/11/2023 2331 by Calos Harper RN  Outcome: Progressing  Note: Pt denied having withdrawal symptoms     Problem: Involuntary Admit  Goal: Will cooperate with staff recommendations and doctor's orders and will demonstrate appropriate behavior  Description: INTERVENTIONS:  1. Treat underlying conditions and offer medication as ordered  2. Educate regarding involuntary admission procedures and rules  3. Contain excessive/inappropriate behavior per unit and hospital policies  2/99/2363 [id number] by Alia Hartman RN  Outcome: Progressing  8/11/2023 2331 by Calos Harper RN  Outcome: Progressing  Flowsheets (Taken 8/11/2023 2331)  Will cooperate with staff recommendations and doctor's orders and will demonstrate appropriate behavior: Treat underlying conditions and offer medication as ordered     Problem: Anxiety  Goal: Will report anxiety at manageable levels  Description: INTERVENTIONS:  1. Administer medication as ordered  2. Teach and rehearse alternative coping skills  3. Provide emotional support with 1:1 interaction with staff  8/12/2023 1251 by Alia Hartman RN  Outcome: Not Progressing  Note: Patient very upset after talking with the doctor, pacing halls, loud, difficult to redirect. Patient agreeable to taking Ativan and Haldol orally. Mom and dad in with fair interaction.   8/11/2023 2331 by Calos Harper RN  Outcome: Progressing  Flowsheets (Taken 8/11/2023 2331)  Will report anxiety at manageable levels: Administer
Instruct patient/family in relaxation techniques, as appropriate  5. Assess for spiritual pain/suffering and initiate Spiritual Care, Psychosocial Clinical Specialist consults as needed  8/11/2023 2331 by Kenzie Cole RN  Outcome: Progressing  Flowsheets (Taken 8/11/2023 2331)  Patient/family able to verbalize anxieties, fears, and concerns, and demonstrate effective coping: Provide emotional support, including active listening and acknowledgement of concerns of patient and caregivers  8/11/2023 1333 by Ban Lisa  Outcome: Not Progressing   Care plan reviewed with patient.   Patient does verbalize understanding of the plan of care and does contribute to goal setting

## 2023-08-15 ENCOUNTER — TELEPHONE (OUTPATIENT)
Dept: PSYCHIATRY | Age: 38
End: 2023-08-15

## 2023-08-15 NOTE — DISCHARGE SUMMARY
of what happened as it can differ from what was documented by police and the ED. He refused. He just said that the police were called by his parents because they were worried about him. He said the police \"detained me. \"  When asked about his suicidal thoughts, he stated \"I always have suicidal thoughts. \"  He stated he has been feeling suicidal for many years. He denied anything making him feel suicidal.  He did confirm that he had a plan of suicide by  prior to admission and has thought of multiple plans. He said he thinks about suicide \"every day. \"  He reported he has never acted on his chronic suicidal thoughts. When asked what stopped him in the past, he said he did not know. Edy Nunez reported he has been feeling depressed for over 30 years. He stated 2-3 years ago he went to [facility] but did not have a good experience there. He stated it was not helpful. He said at that time he was on a medication for bipolar disorder. He said he has been on so many medications in the past.  He endorses feeling down and sad for more days than not. He stated he has been having trouble falling and staying asleep at home. He still feels tired when he wakes up. His appetite has been poor. He has been having trouble with energy and motivation. He has been feeling worthless, hopeless and helpless. Edy Nunez was irritable and minimally engaged during the interview. He continued to endorse depression and active suicidal ideation stating he is always suicidal and depressed. He denied any specific plan or intent at this time. He verbalizes that he does not feel that we will be able to help him here. He did state he wants to get better but does not think this admission will help him. He denied hallucinations. No evidence of delusions or overt psychosis on examination. Patient was medicated with PO Haldol and Ativan this morning around 1030am. Per Laurence RN: \"Patient approaches unit demanding to leave.  Writer

## 2023-08-15 NOTE — TELEPHONE ENCOUNTER
Patient contacted in regards to recent hospital discharge. Patient plans on going to follow up appointment tomorrow. Patient given unit fax number pr request. Denies additional cares at this time.